# Patient Record
Sex: FEMALE | Race: WHITE | Employment: UNEMPLOYED | URBAN - METROPOLITAN AREA
[De-identification: names, ages, dates, MRNs, and addresses within clinical notes are randomized per-mention and may not be internally consistent; named-entity substitution may affect disease eponyms.]

---

## 2017-11-17 PROBLEM — R00.2 HEART PALPITATIONS: Status: ACTIVE | Noted: 2017-11-17

## 2023-11-05 ENCOUNTER — HOSPITAL ENCOUNTER (EMERGENCY)
Age: 53
Discharge: HOME | End: 2023-11-05
Attending: EMERGENCY MEDICINE

## 2023-11-05 VITALS
HEART RATE: 67 BPM | SYSTOLIC BLOOD PRESSURE: 128 MMHG | OXYGEN SATURATION: 99 % | TEMPERATURE: 98.5 F | DIASTOLIC BLOOD PRESSURE: 75 MMHG | WEIGHT: 130 LBS | BODY MASS INDEX: 20.4 KG/M2 | RESPIRATION RATE: 16 BRPM | HEIGHT: 67 IN

## 2023-11-05 DIAGNOSIS — K04.7 DENTAL INFECTION: Primary | ICD-10-CM

## 2023-11-05 PROCEDURE — 99283 EMERGENCY DEPT VISIT LOW MDM: CPT

## 2023-11-05 PROCEDURE — 6370000000 HC RX 637 (ALT 250 FOR IP): Performed by: EMERGENCY MEDICINE

## 2023-11-05 RX ORDER — CLINDAMYCIN HYDROCHLORIDE 150 MG/1
300 CAPSULE ORAL ONCE
Status: COMPLETED | OUTPATIENT
Start: 2023-11-05 | End: 2023-11-05

## 2023-11-05 RX ORDER — CLINDAMYCIN HYDROCHLORIDE 150 MG/1
300 CAPSULE ORAL 3 TIMES DAILY
Qty: 42 CAPSULE | Refills: 0 | Status: SHIPPED | OUTPATIENT
Start: 2023-11-05 | End: 2023-11-12

## 2023-11-05 RX ORDER — CLINDAMYCIN HYDROCHLORIDE 150 MG/1
300 CAPSULE ORAL 3 TIMES DAILY
Qty: 42 CAPSULE | Refills: 0 | Status: SHIPPED | OUTPATIENT
Start: 2023-11-05 | End: 2023-11-05 | Stop reason: SDUPTHER

## 2023-11-05 RX ADMIN — CLINDAMYCIN HYDROCHLORIDE 300 MG: 150 CAPSULE ORAL at 07:45

## 2023-11-05 ASSESSMENT — PAIN SCALES - GENERAL: PAINLEVEL_OUTOF10: 1

## 2023-11-05 ASSESSMENT — PAIN - FUNCTIONAL ASSESSMENT: PAIN_FUNCTIONAL_ASSESSMENT: 0-10

## 2023-11-05 NOTE — ED NOTES
This RN assumed care of pt  Pt presents as walk in from home with c/o R facial swelling from decaying tooth. Pt states she has been unable to make dentist appointment for removal and swelling only started last night. Pt denies SOB, chest pain, n/v  Pt denies thinners. Pt AxOx4 at this time. Airway patent, trachea midline. Respirations even and unlabored in NAD. Pt c/o pain 8/10. No drainage noted from mouth. Pt denies any blood in sputum. All vitals WNL. Awaiting provider orders.       Jae Donnelly RN  11/05/23 [id number]

## 2023-11-06 ASSESSMENT — ENCOUNTER SYMPTOMS
NAUSEA: 0
RHINORRHEA: 0
SHORTNESS OF BREATH: 0
FACIAL SWELLING: 1
VOMITING: 0
EYES NEGATIVE: 1
ALLERGIC/IMMUNOLOGIC NEGATIVE: 1
TROUBLE SWALLOWING: 0
ABDOMINAL PAIN: 0

## 2023-11-06 NOTE — ED PROVIDER NOTES
[facility] ED  eMERGENCY dEPARTMENT eNCOUnter      Pt Name: Aniceto Herrera  MRN: [id number]  [address] [facility] [place] 1970  Date of evaluation: 11/5/2023  Provider: Monalisa Campos MD    [address] [facility]       Chief Complaint   Patient presents with    Facial Swelling     Swelling to left lower jaw. States it started yesterday afternoon. States some difficulty swallowing no difficulty breathing. Pt is handling her secretions well. HISTORY OF PRESENT ILLNESS   (Location/Symptom, Timing/Onset,Context/Setting, Quality, Duration, Modifying Factors, Severity)  Note limiting factors. Aniceto Herrera is a 50 y.o. female who presents to the emergency department complaint of dental infection. Patient admits that she has had a broken tooth on the left lower mandible region, for some time. Patient admits over last several days increasing swelling and believes that she has been developing dental abscess. Patient has moderate pain. Patient denies kary fevers or chills. Patient has difficulty with swallowing. This patient denies other acute symptomatologies. This kind patient significant financial hardship and try to see a dentist.  Patient is looking for assistance with medications and dental referral.    HPI    NursingNotes were reviewed. REVIEW OF SYSTEMS    (2-9 systems for level 4, 10 or more for level 5)     Review of Systems   Constitutional:  Positive for fatigue. Negative for activity change, chills and fever. HENT:  Positive for dental problem and facial swelling. Negative for congestion, ear pain, hearing loss, rhinorrhea and trouble swallowing. Eyes: Negative. Respiratory:  Negative for shortness of breath. Gastrointestinal:  Negative for abdominal pain, nausea and vomiting. Endocrine: Negative. Genitourinary:  Negative for dysuria. Musculoskeletal:  Negative for gait problem and neck pain. Skin: Negative. Allergic/Immunologic: Negative. Hematological: Negative.